# Patient Record
Sex: FEMALE | Race: BLACK OR AFRICAN AMERICAN | NOT HISPANIC OR LATINO | URBAN - METROPOLITAN AREA
[De-identification: names, ages, dates, MRNs, and addresses within clinical notes are randomized per-mention and may not be internally consistent; named-entity substitution may affect disease eponyms.]

---

## 2023-03-29 ENCOUNTER — INPATIENT (INPATIENT)
Facility: HOSPITAL | Age: 48
LOS: 1 days | Discharge: HOME | End: 2023-03-31
Attending: INTERNAL MEDICINE | Admitting: INTERNAL MEDICINE
Payer: SELF-PAY

## 2023-03-29 VITALS
SYSTOLIC BLOOD PRESSURE: 117 MMHG | HEART RATE: 81 BPM | TEMPERATURE: 98 F | RESPIRATION RATE: 18 BRPM | DIASTOLIC BLOOD PRESSURE: 77 MMHG | OXYGEN SATURATION: 100 %

## 2023-03-29 PROCEDURE — 99285 EMERGENCY DEPT VISIT HI MDM: CPT

## 2023-03-29 NOTE — CHART NOTE - NSCHARTNOTEFT_GEN_A_CORE
Pt is a 48 y/o female who lives in [place], no known cardiac hx, presenting with a variety of symptoms per sister with whom she has been living. Symptoms include disorientation, not sleeping at night, weight loss, fatigue, dizziness. Patient began taking levothyroxine 25mcg daily two weeks ago for recently diagnosed hypothyroidism. TSH today 82.57. Per ED team, patient is awake, alert and oriented x3. Sodium 140, BP, HR and temp normal. Weighs ~66kg. Full weight-based levothyroxine dose is about 100mcg daily. Would empirically increase levothyroxine to 50mcg daily. Can give 25mcg PO tonight to supplement this morning's dose and can use 50mcg PO daily starting tomorrow morning on an empty stomach. FT4 pending.    Full consult on 3/30.    Discussed with ED FLORIDALMA Fisher.    Jalen Ott DO, Endocrinology Fellow  For follow-up questions, discharge recommendations, or new consults please call answering service at [phone] (weekdays), [phone] (nights/weekends). For nonurgent matters, please email [email or web address] or [email or web address].

## 2023-03-29 NOTE — ED ADULT TRIAGE NOTE - CHIEF COMPLAINT QUOTE
downtime triage:  c/o worsening palpitations, anxiety, chest pain x 2 weeks. Pt diagnosed with hypothyroidism in [place]. recently prescribed levothyroxine 25 mcg.    PMH- hypothyroidism

## 2023-03-30 DIAGNOSIS — R74.8 ABNORMAL LEVELS OF OTHER SERUM ENZYMES: ICD-10-CM

## 2023-03-30 DIAGNOSIS — R41.82 ALTERED MENTAL STATUS, UNSPECIFIED: ICD-10-CM

## 2023-03-30 DIAGNOSIS — N91.5 OLIGOMENORRHEA, UNSPECIFIED: ICD-10-CM

## 2023-03-30 DIAGNOSIS — I10 ESSENTIAL (PRIMARY) HYPERTENSION: ICD-10-CM

## 2023-03-30 DIAGNOSIS — K59.00 CONSTIPATION, UNSPECIFIED: ICD-10-CM

## 2023-03-30 DIAGNOSIS — G93.41 METABOLIC ENCEPHALOPATHY: ICD-10-CM

## 2023-03-30 DIAGNOSIS — Z29.9 ENCOUNTER FOR PROPHYLACTIC MEASURES, UNSPECIFIED: ICD-10-CM

## 2023-03-30 DIAGNOSIS — E03.9 HYPOTHYROIDISM, UNSPECIFIED: ICD-10-CM

## 2023-03-30 DIAGNOSIS — E78.5 HYPERLIPIDEMIA, UNSPECIFIED: ICD-10-CM

## 2023-03-30 LAB
ALBUMIN SERPL ELPH-MCNC: 5.2 G/DL — HIGH (ref 3.3–5)
ALP SERPL-CCNC: 71 U/L — SIGNIFICANT CHANGE UP (ref 40–120)
ALT FLD-CCNC: 69 U/L — HIGH (ref 4–33)
ANION GAP SERPL CALC-SCNC: 12 MMOL/L — SIGNIFICANT CHANGE UP (ref 7–14)
AST SERPL-CCNC: 43 U/L — HIGH (ref 4–32)
BASOPHILS # BLD AUTO: 0.02 K/UL — SIGNIFICANT CHANGE UP (ref 0–0.2)
BASOPHILS NFR BLD AUTO: 0.4 % — SIGNIFICANT CHANGE UP (ref 0–2)
BILIRUB SERPL-MCNC: 0.6 MG/DL — SIGNIFICANT CHANGE UP (ref 0.2–1.2)
BLOOD GAS VENOUS COMPREHENSIVE RESULT: SIGNIFICANT CHANGE UP
BUN SERPL-MCNC: 12 MG/DL — SIGNIFICANT CHANGE UP (ref 7–23)
CALCIUM SERPL-MCNC: 10.5 MG/DL — SIGNIFICANT CHANGE UP (ref 8.4–10.5)
CHLORIDE SERPL-SCNC: 101 MMOL/L — SIGNIFICANT CHANGE UP (ref 98–107)
CO2 SERPL-SCNC: 24 MMOL/L — SIGNIFICANT CHANGE UP (ref 22–31)
CREAT SERPL-MCNC: 1.09 MG/DL — SIGNIFICANT CHANGE UP (ref 0.5–1.3)
EGFR: 62 ML/MIN/1.73M2 — SIGNIFICANT CHANGE UP
EOSINOPHIL # BLD AUTO: 0.04 K/UL — SIGNIFICANT CHANGE UP (ref 0–0.5)
EOSINOPHIL NFR BLD AUTO: 0.7 % — SIGNIFICANT CHANGE UP (ref 0–6)
GLUCOSE SERPL-MCNC: 100 MG/DL — HIGH (ref 70–99)
HCT VFR BLD CALC: 44 % — SIGNIFICANT CHANGE UP (ref 34.5–45)
HGB BLD-MCNC: 14 G/DL — SIGNIFICANT CHANGE UP (ref 11.5–15.5)
IANC: 2.98 K/UL — SIGNIFICANT CHANGE UP (ref 1.8–7.4)
IMM GRANULOCYTES NFR BLD AUTO: 0.2 % — SIGNIFICANT CHANGE UP (ref 0–0.9)
LYMPHOCYTES # BLD AUTO: 1.88 K/UL — SIGNIFICANT CHANGE UP (ref 1–3.3)
LYMPHOCYTES # BLD AUTO: 35.1 % — SIGNIFICANT CHANGE UP (ref 13–44)
MCHC RBC-ENTMCNC: 29.5 PG — SIGNIFICANT CHANGE UP (ref 27–34)
MCHC RBC-ENTMCNC: 31.8 GM/DL — LOW (ref 32–36)
MCV RBC AUTO: 92.8 FL — SIGNIFICANT CHANGE UP (ref 80–100)
MONOCYTES # BLD AUTO: 0.42 K/UL — SIGNIFICANT CHANGE UP (ref 0–0.9)
MONOCYTES NFR BLD AUTO: 7.9 % — SIGNIFICANT CHANGE UP (ref 2–14)
NEUTROPHILS # BLD AUTO: 2.98 K/UL — SIGNIFICANT CHANGE UP (ref 1.8–7.4)
NEUTROPHILS NFR BLD AUTO: 55.7 % — SIGNIFICANT CHANGE UP (ref 43–77)
NRBC # BLD: 0 /100 WBCS — SIGNIFICANT CHANGE UP (ref 0–0)
NRBC # FLD: 0 K/UL — SIGNIFICANT CHANGE UP (ref 0–0)
PLATELET # BLD AUTO: 233 K/UL — SIGNIFICANT CHANGE UP (ref 150–400)
POTASSIUM SERPL-MCNC: 4.3 MMOL/L — SIGNIFICANT CHANGE UP (ref 3.5–5.3)
POTASSIUM SERPL-SCNC: 4.3 MMOL/L — SIGNIFICANT CHANGE UP (ref 3.5–5.3)
PROT SERPL-MCNC: 8.6 G/DL — HIGH (ref 6–8.3)
RBC # BLD: 4.74 M/UL — SIGNIFICANT CHANGE UP (ref 3.8–5.2)
RBC # FLD: 13.6 % — SIGNIFICANT CHANGE UP (ref 10.3–14.5)
SODIUM SERPL-SCNC: 137 MMOL/L — SIGNIFICANT CHANGE UP (ref 135–145)
T3 SERPL-MCNC: 101 NG/DL — SIGNIFICANT CHANGE UP (ref 80–200)
T4 AB SER-ACNC: 4.83 UG/DL — LOW (ref 5.1–13)
TSH SERPL-MCNC: 99.25 UIU/ML — HIGH (ref 0.27–4.2)
WBC # BLD: 5.35 K/UL — SIGNIFICANT CHANGE UP (ref 3.8–10.5)
WBC # FLD AUTO: 5.35 K/UL — SIGNIFICANT CHANGE UP (ref 3.8–10.5)

## 2023-03-30 PROCEDURE — 70450 CT HEAD/BRAIN W/O DYE: CPT | Mod: 26

## 2023-03-30 PROCEDURE — 99223 1ST HOSP IP/OBS HIGH 75: CPT | Mod: GC

## 2023-03-30 PROCEDURE — 99255 IP/OBS CONSLTJ NEW/EST HI 80: CPT | Mod: GC

## 2023-03-30 RX ORDER — INFLUENZA VIRUS VACCINE 15; 15; 15; 15 UG/.5ML; UG/.5ML; UG/.5ML; UG/.5ML
0.5 SUSPENSION INTRAMUSCULAR ONCE
Refills: 0 | Status: DISCONTINUED | OUTPATIENT
Start: 2023-03-30 | End: 2023-03-31

## 2023-03-30 RX ORDER — ONDANSETRON 8 MG/1
4 TABLET, FILM COATED ORAL EVERY 8 HOURS
Refills: 0 | Status: DISCONTINUED | OUTPATIENT
Start: 2023-03-30 | End: 2023-03-31

## 2023-03-30 RX ORDER — LEVOTHYROXINE SODIUM 125 MCG
103 TABLET ORAL ONCE
Refills: 0 | Status: DISCONTINUED | OUTPATIENT
Start: 2023-03-30 | End: 2023-03-30

## 2023-03-30 RX ORDER — LANOLIN ALCOHOL/MO/W.PET/CERES
3 CREAM (GRAM) TOPICAL AT BEDTIME
Refills: 0 | Status: DISCONTINUED | OUTPATIENT
Start: 2023-03-30 | End: 2023-03-31

## 2023-03-30 RX ORDER — SODIUM CHLORIDE 9 MG/ML
1000 INJECTION INTRAMUSCULAR; INTRAVENOUS; SUBCUTANEOUS ONCE
Refills: 0 | Status: COMPLETED | OUTPATIENT
Start: 2023-03-30 | End: 2023-03-30

## 2023-03-30 RX ORDER — ACETAMINOPHEN 500 MG
650 TABLET ORAL EVERY 6 HOURS
Refills: 0 | Status: DISCONTINUED | OUTPATIENT
Start: 2023-03-30 | End: 2023-03-31

## 2023-03-30 RX ORDER — LEVOTHYROXINE SODIUM 125 MCG
103 TABLET ORAL AT BEDTIME
Refills: 0 | Status: DISCONTINUED | OUTPATIENT
Start: 2023-03-30 | End: 2023-03-31

## 2023-03-30 RX ADMIN — Medication 1 MILLIGRAM(S): at 17:31

## 2023-03-30 RX ADMIN — SODIUM CHLORIDE 1000 MILLILITER(S): 9 INJECTION INTRAMUSCULAR; INTRAVENOUS; SUBCUTANEOUS at 17:30

## 2023-03-30 RX ADMIN — SODIUM CHLORIDE 1000 MILLILITER(S): 9 INJECTION INTRAMUSCULAR; INTRAVENOUS; SUBCUTANEOUS at 19:39

## 2023-03-30 RX ADMIN — Medication 103 MICROGRAM(S): at 22:23

## 2023-03-30 NOTE — H&P ADULT - PROBLEM SELECTOR PLAN 4
- ddx include perimenopausal vs thyroid insufficiency vs both  - will c/w synthroid now and pt requires outpt f/u w/ endo re oligomenorrhea likely caused by thyroid insufficiency

## 2023-03-30 NOTE — CONSULT NOTE ADULT - ASSESSMENT
This is a 48 yo F .w no known PMH who presents due to AMS at home. Family is concerned patient's mental status has been deteriorating recently and patient has appeared confused and disoriented from her baseline. Endocrinology consulted for severe hypothyroidism.    #severe hypothyroidism  -please check TPO antibody  -s/p LT4 103mcg IV x1  -can continue levothyroxine 103mcg IV nightly starting tomorrow  -recheck FT on 4/3/23  -check 8AM cortisol - low suspicion for myxedema at this time, but given family's concerns can assess adrenal function as well    #discharge  -plan to discharge on Levothyroxine 137mcg PO daily - reviewed with patient and family how to properly take this medication  -patient will need repeat labs in 4 weeks  -will likely need to follow up with endocrinology in [place] but can arrange follow up with our endocrine department if she remains in [place] through May    #HLD  -can evaluate for hyperlipidemia as an outpatient given current hypothyroidism    #HTN  -no evidence of HTN  -BP goal <130/80    Case discussed with Dr. Toya Bowles MD  Endocrine Fellow  Can be reached via teams. For follow up questions, discharge recommendations, or new consults, please call answering service at [phone] (weekdays); [phone] (nights/weekends) This is a 46 yo F .w no known PMH who presents due to AMS at home. Family is concerned patient's mental status has been deteriorating recently and patient has appeared confused and disoriented from her baseline. Endocrinology consulted for severe hypothyroidism.    #severe hypothyroidism  -please check TPO antibody  -s/p LT4 103mcg IV x1  -can continue levothyroxine 103mcg IV nightly starting tomorrow  -recheck FT4 on 4/3/23  -check 8AM cortisol - low suspicion for myxedema at this time, but given family's concerns can assess adrenal function as well    #discharge  -plan to discharge on Levothyroxine 137mcg PO daily - reviewed with patient and family how to properly take this medication  -patient will need repeat labs in 4 weeks  -will likely need to follow up with endocrinology in [place] but can arrange follow up with our endocrine department if she remains in [place] through May    #HLD  -can evaluate for hyperlipidemia as an outpatient given current hypothyroidism    #HTN  -no evidence of HTN  -BP goal <130/80    Case discussed with Dr. Toya Bowles MD  Endocrine Fellow  Can be reached via teams. For follow up questions, discharge recommendations, or new consults, please call answering service at [phone] (weekdays); [phone] (nights/weekends)

## 2023-03-30 NOTE — ED CDU PROVIDER INITIAL DAY NOTE - ATTENDING APP SHARED VISIT CONTRIBUTION OF CARE
I agree with PA documentation   this chart was documented during computer downtime  refer to my initial ED note also documented on paper    as documented by PA  "Patient is a 47-year-old female recent diagnosis of thyroid dysfunction in her country of [place] 1 week ago, here on vacation to visit family, brought in by family for episodes of disorientation, weakness, and numbness/tingling to arms and legs, as well as altered mental status.  In the main ED patient was found to have a TSH of 82.57, endocrine was contacted, recommended starting 50 mcg p.o. daily.  Throughout the day the patient was noted to be somewhat altered, stated that she would like to be discharged, states she cannot " trust" the healthcare system so would like to be discharged.  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  States she jogs regularly and eats healthy.  Family at bedside attesting to this.  Patient's niece who is a physician was concerned patient may have myxedema due to her mental status, however vital signs have been stable.  Patient's neuro exam has been nonfocal.  No known history of psychiatric illness. "    placed in CDU for endocrinology to see due to severe hypothyroidism

## 2023-03-30 NOTE — PATIENT PROFILE ADULT - FALL HARM RISK - HARM RISK INTERVENTIONS
Assistance with ambulation/Assistance OOB with selected safe patient handling equipment/Communicate Risk of Fall with Harm to all staff/Reinforce activity limits and safety measures with patient and family/Review medications for side effects contributing to fall risk/Sit up slowly, dangle for a short time, stand at bedside before walking/Tailored Fall Risk Interventions/Visual Cue: Yellow wristband and red socks/Bed in lowest position, wheels locked, appropriate side rails in place/Call bell, personal items and telephone in reach/Instruct patient to call for assistance before getting out of bed or chair/Non-slip footwear when patient is out of bed/[place] to call system/Physically safe environment - no spills, clutter or unnecessary equipment/Purposeful Proactive Rounding/Room/bathroom lighting operational, light cord in reach

## 2023-03-30 NOTE — H&P ADULT - PROBLEM SELECTOR PLAN 3
- DVT: none, pt ambulatory  - diet: regular  - dispo: home - pt constipated as her last BM at least 2d ago  - likely caused by hypothyroidism  - c/w synthroid IV while inpatient and PO outpatient  - will encourage PO intake and start bowel regimen - pt constipated as her last BM at least 2d ago  - likely caused by hypothyroidism  - c/w synthroid IV while inpatient and PO outpatient  - will encourage PO intake and start bowel regimen  -Senna, Miralax for now

## 2023-03-30 NOTE — H&P ADULT - NSHPLABSRESULTS_GEN_ALL_CORE
137  |  101  |  12  ----------------------------<  100<H>  4.3   |  24  |  1.09      140  |  104  |  9   ----------------------------<  88  4.5   |  22  |  1.05    Ca    10.5      30 Mar 2023 17:20  Ca    10.4      29 Mar 2023 16:30    TPro  8.6<H>  /  Alb  5.2<H>  /  TBili  0.6  /  DBili  x   /  AST  43<H>  /  ALT  69<H>  /  AlkPhos  71    TPro  8.5<H>  /  Alb  5.2<H>  /  TBili  0.7  /  DBili  x   /  AST  51<H>  /  ALT  67<H>  /  AlkPhos  72                      Urinalysis Basic - ( 29 Mar 2023 17:47 )    Color: Light Yellow / Appearance: Clear / S.012 / pH: x  Gluc: x / Ketone: Negative  / Bili: Negative / Urobili: <2 mg/dL   Blood: x / Protein: Negative / Nitrite: Negative   Leuk Esterase: Negative / RBC: x / WBC x   Sq Epi: x / Non Sq Epi: x / Bacteria: x                              14.0   5.35  )-----------( 233      ( 30 Mar 2023 17:20 )             44.0                         14.0   5.39  )-----------( 205      ( 29 Mar 2023 16:30 )             43.1     CAPILLARY BLOOD GLUCOSE    Thyroid Stimulating Hormone, Serum: 99.25 uIU/mL (23 @ 17:20)  Free Thyroxine, Serum: 0.5 ng/dL (23 @ 16:30) .    -Personally interpreted EKbpm, qtc 392, no acute Tw or ST, no PACs or PVCs    -Personally reviewed multiple labs below:        137  |  101  |  12  ----------------------------<  100<H>  4.3   |  24  |  1.09      140  |  104  |  9   ----------------------------<  88  4.5   |  22  |  1.05    Ca    10.5      30 Mar 2023 17:20  Ca    10.4      29 Mar 2023 16:30    TPro  8.6<H>  /  Alb  5.2<H>  /  TBili  0.6  /  DBili  x   /  AST  43<H>  /  ALT  69<H>  /  AlkPhos  71    TPro  8.5<H>  /  Alb  5.2<H>  /  TBili  0.7  /  DBili  x   /  AST  51<H>  /  ALT  67<H>  /  AlkPhos  72                      Urinalysis Basic - ( 29 Mar 2023 17:47 )    Color: Light Yellow / Appearance: Clear / S.012 / pH: x  Gluc: x / Ketone: Negative  / Bili: Negative / Urobili: <2 mg/dL   Blood: x / Protein: Negative / Nitrite: Negative   Leuk Esterase: Negative / RBC: x / WBC x   Sq Epi: x / Non Sq Epi: x / Bacteria: x               14.0   5.35  )-----------( 233      ( 30 Mar 2023 17:20 )             44.0                         14.0   5.39  )-----------( 205      ( 29 Mar 2023 16:30 )             43.1     CAPILLARY BLOOD GLUCOSE    Thyroid Stimulating Hormone, Serum: 99.25 uIU/mL (23 @ 17:20)  Free Thyroxine, Serum: 0.5 ng/dL (23 @ 16:30)      -Personally reviewed: CT BRAIN     PROCEDURE DATE:  2023    INTERPRETATION:  CLINICAL INDICATION: Altered mental status  COMPARISON: No prior imaging available for comparison.  FINDINGS:  There is no acute intra-axial or extra-axial hemorrhage. There is no mass   effect.  Gray-white differentiation is intact.  The ventricles are normal in size.  The visualized paranasal sinuses and mastoid air cells are clear.  The orbits are within normal limits.  The calvarium is intact.  IMPRESSION:  No acute intra-cranial hemorrhage or mass effect.

## 2023-03-30 NOTE — H&P ADULT - NSHPPHYSICALEXAM_GEN_ALL_CORE
VITALS:   T(C): 36.7 (03-30-23 @ 18:34), Max: 37.1 (03-29-23 @ 22:39)  HR: 80 (03-30-23 @ 18:34) (72 - 88)  BP: 111/76 (03-30-23 @ 18:34) (110/85 - 140/72)  RR: 16 (03-30-23 @ 18:34) (16 - 18)  SpO2: 97% (03-30-23 @ 18:34) (97% - 100%)    GENERAL: NAD, lying in bed comfortably  HEAD:  Atraumatic, normocephalic  EYES: EOMI, PERRLA, conjunctiva and sclera clear  ENT: Moist mucous membranes, thyroid gland normal, no nodules felt  NECK: Supple, no JVD  HEART: Regular rate and rhythm, no murmurs, rubs, or gallops  LUNGS: Unlabored respirations.  Clear to auscultation bilaterally, no crackles, wheezing, or rhonchi  ABDOMEN: Soft, nontender, nondistended, +BS  EXTREMITIES: 2+ peripheral pulses bilaterally. No clubbing, cyanosis, or edema  NERVOUS SYSTEM:  A&Ox3, no focal deficits   SKIN: No rashes or lesions Vital Signs Last 24 Hrs  T(C): 36.7 (30 Mar 2023 22:01), Max: 37.1 (30 Mar 2023 09:47)  T(F): 98 (30 Mar 2023 22:01), Max: 98.7 (30 Mar 2023 09:47)  HR: 84 (30 Mar 2023 22:01) (75 - 88)  BP: 113/78 (30 Mar 2023 22:01) (111/76 - 140/72)  BP(mean): --  RR: 17 (30 Mar 2023 22:01) (16 - 18)  SpO2: 100% (30 Mar 2023 22:01) (97% - 100%)    Parameters below as of 30 Mar 2023 22:01  Patient On (Oxygen Delivery Method): room air    GENERAL: NAD, lying in bed comfortably  HEAD:  Atraumatic, normocephalic  EYES: EOMI, PERRLA, conjunctiva and sclera clear  ENT: Moist mucous membranes, thyroid gland normal, no nodules felt  NECK: Supple, no JVD  HEART: Regular rate and rhythm, no murmurs, rubs, or gallops  LUNGS: Unlabored respirations.  Clear to auscultation bilaterally, no crackles, wheezing, or rhonchi  ABDOMEN: Soft, nontender, nondistended, +BS  EXTREMITIES: 2+ peripheral pulses bilaterally. No clubbing, cyanosis, or edema  NERVOUS SYSTEM:  A&Ox3, no focal deficits, slightly lethargic   SKIN: No rashes or lesions

## 2023-03-30 NOTE — H&P ADULT - NSHPREVIEWOFSYSTEMS_GEN_ALL_CORE
CONSTITUTIONAL: No weakness, fevers or chills  EYES/ENT: No visual changes;  No vertigo or throat pain   NECK: No pain or stiffness  RESPIRATORY: No cough, wheezing, hemoptysis; No shortness of breath  CARDIOVASCULAR: No chest pain or palpitations  GASTROINTESTINAL: +constipation  GENITOURINARY: No dysuria, frequency or hematuria  NEUROLOGICAL: No numbness or weakness  SKIN: +dry skin  PSYCH: +stress CONSTITUTIONAL: No weakness, fevers or chills  EYES/ENT: No visual changes;  No vertigo or throat pain   NECK: No pain or stiffness  RESPIRATORY: No cough, wheezing, hemoptysis; No shortness of breath  CARDIOVASCULAR: No chest pain (contrary to triage note), + palpitations  GASTROINTESTINAL: +constipation  GENITOURINARY: No dysuria, frequency or hematuria  NEUROLOGICAL: No numbness or weakness  SKIN: +dry skin  PSYCH: +stress CONSTITUTIONAL: + gen weakness, no fevers or chills  EYES/ENT: No visual changes;  No vertigo or throat pain   NECK: No pain or stiffness  RESPIRATORY: No cough, wheezing, hemoptysis; No shortness of breath  CARDIOVASCULAR: No chest pain (contrary to triage note), + palpitations  GASTROINTESTINAL: +constipation  GENITOURINARY: No dysuria, frequency or hematuria  NEUROLOGICAL: No numbness or weakness  SKIN: +dry skin  PSYCH: +stress

## 2023-03-30 NOTE — H&P ADULT - ASSESSMENT
47F PMH recently identified hypothyroidism presented to the ED due to abnormal behaviors. 46yo Female MHx a/w severe symptomatic hypothyroidism c/b metabolic encephelopathy;

## 2023-03-30 NOTE — ED CDU PROVIDER DISPOSITION NOTE - CLINICAL COURSE
Patient is a 47-year-old female recent diagnosis of thyroid dysfunction in her country of [place] 1 week ago, here on vacation to visit family, brought in by family for episodes of disorientation, weakness, and numbness/tingling to arms and legs, as well as altered mental status.  In the main ED patient was found to have a TSH of 82.57, endocrine was contacted, recommended starting 50 mcg p.o. daily.  Throughout the day the patient was noted to be somewhat altered, stated that she would like to be discharged, states she cannot " trust" the healthcare system so would like to be discharged.  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  States she jogs regularly and eats healthy.  Family at bedside attesting to this.  Patient's niece who is a physician was concerned patient may have myxedema due to her mental status, however vital signs have been stable.  Patient's neuro exam has been nonfocal.  No known history of psychiatric illness.  Patient seen by Dr. Marcano this evening who agrees to start IV Synthroid however low suspicion for myxedema at this time.  Plan to get a CT head to rule out structural reasons for mental status.  Patient given Ativan for anxiety.  Plan to admit for further management of severe hypothyroidism no need for telemetry per hospitalist

## 2023-03-30 NOTE — ED PROVIDER NOTE - NS ED ATTENDING STATEMENT MOD
This was a shared visit with the ANALISA. I reviewed and verified the documentation and independently performed the documented:

## 2023-03-30 NOTE — H&P ADULT - PROBLEM SELECTOR PLAN 2
- pt A&OX3 on my exam but per family has moments of confusion/out of the ordinary  - most likely metabolic iso thyroid deficiency  - c/w thyroid supplementations - pt A&OX3 on my exam but per family has moments of confusion/out of the ordinary  - most likely metabolic iso thyroid deficiency vs neurological cause  - pt had one episode of agitation during the day, receive 1 ativan, s/p head CT  - f/u CT head to make sure no bleed, although no focal neurological signs  - c/w thyroid supplementations - pt A&OX3 on my exam but per family has moments of confusion/out of the ordinary  - most likely metabolic iso thyroid deficiency vs neurological cause  - pt had one episode of agitation during the day, receive 1 ativan, s/p head CT  - f/u CT head to make sure no bleed, although no focal neurological signs  - c/w thyroid supplementations  -Urine tox ordered  -Ammonia in AM

## 2023-03-30 NOTE — H&P ADULT - PROBLEM SELECTOR PLAN 6
- DVT: none, pt ambulatory  - diet: regular  - dispo: home - DVT: Lovenox sq  - diet: regular  - dispo: home

## 2023-03-30 NOTE — H&P ADULT - HISTORY OF PRESENT ILLNESS
47F PMH recently identified hypothyroidism presented to the ED due to abnormal behaviors. Per pt her TSH was high and she did not recollect the exact number when she was visiting her GP in [place] and was started on synthroid 25 a few days ago. Pt and family have noticed that despite on synthroid 25 pt started saying things out of the ordinary which pt herself realized later. She also noticed increased stress in work and was told that she should take a break. She has been gaining wt despite keeping active w/ exercise as she noticed increased size of her clothes. She self reported a 11lbs wt loss after being started on synthroid 25. She is constipated w/ the most recent BM at least 2d ago. Her LMP was 1yr ago.    Since the dx of hypothyroidism, in addition to synthroid 25 prescribed by her GP, she started taking [place] and increased seafood intake as well.    In the ED, VSS, EKG NSR, received PO synthroid 50 and 25, ativan 1 due to agitation, and a head CT pending read. TSH 99. 48yo Female MHx recently identified hypothyroidism presented to the ED due to abnormal behaviors. Per pt her TSH was high and she did not recollect the exact number when she was visiting her GP in [place] and was started on synthroid 25 a few days ago. Pt and family have noticed that despite on synthroid 25 pt started saying things out of the ordinary which pt herself realized later. She also noticed increased stress in work and was told that she should take a break. She has been gaining wt despite keeping active w/ exercise as she noticed increased size of her clothes. She self reported a 11lbs wt loss after being started on synthroid 25. She is constipated w/ the most recent BM at least 2d ago. Her LMP was 1yr ago.    Since the dx of hypothyroidism, in addition to synthroid 25 prescribed by her GP, she started taking [place] and increased seafood intake as well.    In the ED, VSS, EKG NSR, received PO synthroid 50 and 25, ativan 1 due to agitation, and a head CT pending read. TSH 99. 46yo Female MHx recently identified hypothyroidism presented to the ED due to abnormal behaviors. Per pt her TSH was "high", did not recollect the exact value when she was visiting her GP in [place]. Was started on Synthroid 25mcg a few days ago. Pt and family have noticed that despite being on synthroid pt started saying things out of the ordinary, which pt herself realized later. She also noticed increased sensation of being tired and stressed  at work and was told that she should take a break. She has been gaining wt despite keeping active w/ exercise as she noticed increased size of her clothes. She self reported a 11lbs wt loss after being started on synthroid. She has been constipated w/ the most recent BM at least 2d ago. Her LMP was 1yr ago.  Since the dx of hypothyroidism, in addition to synthroid 25 prescribed by her GP, she started taking [place] and increased seafood intake as well.    ED course: VSS, EKG NSR,  ativan 1 due to agitation

## 2023-03-30 NOTE — H&P ADULT - RESPIRATORY
Risks/benefits discussed with patient/surrogate/Vaccine Information Sheet (VIS) provided-VIS date: 8/6/21
no wheezes/no rales/no rhonchi

## 2023-03-30 NOTE — H&P ADULT - PROBLEM SELECTOR PLAN 5
- DVT: none, pt ambulatory  - diet: regular  - dispo: home - although pt's samples are mildly hemolyzed, both samples have mildly elevated LFTs  - unsure if it is side effects of sea [place]  - will f/u w/ full liver panel - although pt's samples are mildly hemolyzed, both samples have mildly elevated LFTs  - unsure if it is side effects of sea [place]  - acute hepatitis panel ordered  -trend LFTs

## 2023-03-30 NOTE — H&P ADULT - NSHPSOCIALHISTORY_GEN_ALL_CORE
no toxic habits  lives by herself  has a partner, but sexually inactive (last encounter months ago)  LMP 1yr ago

## 2023-03-30 NOTE — CONSULT NOTE ADULT - SUBJECTIVE AND OBJECTIVE BOX
Consulted for hypothyroidism    HPI:  This is a 48 yo F .w no known PMH who presents due to AMS at home. Family is concerned patient's mental status has been deteriorating recently and patient has appeared confused and disoriented from her baseline. Endocrinology consulted for hypothyroidism.    Patient found to have TSH 82 and FT4 0.5 on admission.    Per patient and family, he patient was found to have hypothyroidism back home in [place] 3 months ago. Family reports TSH was only slightly off. No medication was started at that time. More recently labs were done in [place] and patient had worsening hypothyroidism and was started on 25mcg levothyroxine a few days ago.     Patient has no history of cardiac disease or arrhythmia.     Patient denies symptoms of cold intolerance but has had weight gain, constipation, and excessively dry skin.  Her LMP was 1 year ago. She gets hot flashes sometimes    No history of lithium, amiodarone use. She has never had radiation therapy to the head or neck.       PAST MEDICAL & SURGICAL HISTORY: myomectomy      FAMILY HISTORY: patient has 2 sisters with Graves' disease      Social History: denies all toxic habits    Home Medications:  levothyroxine 25mcg daily    MEDICATIONS  (STANDING):  levothyroxine Injectable 103 MICROGram(s) IV Push once    MEDICATIONS  (PRN):      Allergies    No Known Allergies    Intolerances      Review of Systems:  Constitutional: No fever  Eyes: No blurry vision  Neuro: No tremors  HEENT: No pain  Cardiovascular: No chest pain, palpitations  Respiratory: No SOB, no cough  GI: No nausea, vomiting, abdominal pain  : No dysuria  Skin: no rash  Psych: no depression  Endocrine: no polyuria, polydipsia  Hem/lymph: no swelling  Osteoporosis: no fractures    PHYSICAL EXAM:  VITALS: T(C): 36.7 (03-30-23 @ 18:34)  T(F): 98 (03-30-23 @ 18:34), Max: 98.7 (03-29-23 @ 22:39)  HR: 80 (03-30-23 @ 18:34) (72 - 88)  BP: 111/76 (03-30-23 @ 18:34) (110/85 - 140/72)  RR:  (16 - 18)  SpO2:  (97% - 100%)  Wt(kg): --  GENERAL: NAD  EYES: No proptosis, no lid lag, anicteric  THYROID: Normal size, no palpable nodules  RESPIRATORY: Clear to auscultation bilaterally  CARDIOVASCULAR: Regular rate and rhythm  GI: Soft, nontender, non distended  EXT: b/l feet without wounds; 2+ pulses  PSYCH: Alert and oriented x 3, reactive mood                              14.0   5.35  )-----------( 233      ( 30 Mar 2023 17:20 )             44.0       03-30    137  |  101  |  12  ----------------------------<  100<H>  4.3   |  24  |  1.09    eGFR: 62    Ca    10.5      03-30    TPro  8.6<H>  /  Alb  5.2<H>  /  TBili  0.6  /  DBili  x   /  AST  43<H>  /  ALT  69<H>  /  AlkPhos  71  03-30      Thyroid Function Tests:  03-30 @ 17:20 TSH 99.25 FreeT4 -- T3 -- Anti TPO -- Anti Thyroglobulin Ab -- TSI --  03-29 @ 16:30 TSH 82.57 FreeT4 0.5 T3 -- Anti TPO -- Anti Thyroglobulin Ab -- TSI --      A1C with Estimated Average Glucose Result: 5.6 % (03-29-23 @ 16:30)          Radiology:

## 2023-03-30 NOTE — ED PROVIDER NOTE - ATTENDING APP SHARED VISIT CONTRIBUTION OF CARE
I attest to PA documentation    this patient was seen during downtime and my documentation is on paper chart

## 2023-03-30 NOTE — H&P ADULT - PROBLEM SELECTOR PLAN 1
- TSH 99 despite on synthroid 25  - s/p synthroid 25 and 50 in the ED  - will c/w IV synthroid while inpatient  - will finish hypothyroidism workup including TPO to r/o Hashimoto considering FHx of Graves  - check A1C and lipid panels  - f/u endo recs  - consider thyroid US - TSH 99 despite on synthroid 25  - s/p synthroid 25 and 50 in the ED  - will c/w IV synthroid while inpatient  - will finish hypothyroidism workup including TPO to r/o Hashimoto considering FHx of Graves  - check A1C and lipid panels  - not in myxedema coma, but will still check AM cortisol to r/o adrenal insufficiency  - f/u endo recs  - consider thyroid US Constipation, confusion, lethargy; Slowed on exam; Severe likely under-supplemented hypothyroidism;   Evaluated by Endocrinology;    TSH 99 despite on synthroid 25  - Received 103mcg IVP synthroid   - will c/w IV synthroid while inpatient  - will finish hypothyroidism workup including TPO to r/o Hashimoto considering FHx of Graves  - check A1C and lipid panels  - not in myxedema coma  - will  check AM cortisol to r/o adrenal insufficiency  - f/u Endocrinology  recs  - consider thyroid US

## 2023-03-31 ENCOUNTER — TRANSCRIPTION ENCOUNTER (OUTPATIENT)
Age: 48
End: 2023-03-31

## 2023-03-31 VITALS
TEMPERATURE: 97 F | SYSTOLIC BLOOD PRESSURE: 122 MMHG | RESPIRATION RATE: 16 BRPM | HEART RATE: 84 BPM | OXYGEN SATURATION: 99 % | DIASTOLIC BLOOD PRESSURE: 86 MMHG

## 2023-03-31 LAB
A1C WITH ESTIMATED AVERAGE GLUCOSE RESULT: 5.4 % — SIGNIFICANT CHANGE UP (ref 4–5.6)
ALBUMIN SERPL ELPH-MCNC: 4.6 G/DL — SIGNIFICANT CHANGE UP (ref 3.3–5)
ALP SERPL-CCNC: 57 U/L — SIGNIFICANT CHANGE UP (ref 40–120)
ALT FLD-CCNC: 56 U/L — HIGH (ref 4–33)
ANION GAP SERPL CALC-SCNC: 10 MMOL/L — SIGNIFICANT CHANGE UP (ref 7–14)
AST SERPL-CCNC: 32 U/L — SIGNIFICANT CHANGE UP (ref 4–32)
BASOPHILS # BLD AUTO: 0.01 K/UL — SIGNIFICANT CHANGE UP (ref 0–0.2)
BASOPHILS NFR BLD AUTO: 0.2 % — SIGNIFICANT CHANGE UP (ref 0–2)
BILIRUB SERPL-MCNC: 1 MG/DL — SIGNIFICANT CHANGE UP (ref 0.2–1.2)
BUN SERPL-MCNC: 7 MG/DL — SIGNIFICANT CHANGE UP (ref 7–23)
CALCIUM SERPL-MCNC: 9.9 MG/DL — SIGNIFICANT CHANGE UP (ref 8.4–10.5)
CHLORIDE SERPL-SCNC: 103 MMOL/L — SIGNIFICANT CHANGE UP (ref 98–107)
CHOLEST SERPL-MCNC: 203 MG/DL — HIGH
CO2 SERPL-SCNC: 24 MMOL/L — SIGNIFICANT CHANGE UP (ref 22–31)
CORTIS AM PEAK SERPL-MCNC: 16.2 UG/DL — SIGNIFICANT CHANGE UP (ref 6–18.4)
CREAT SERPL-MCNC: 1.06 MG/DL — SIGNIFICANT CHANGE UP (ref 0.5–1.3)
EGFR: 64 ML/MIN/1.73M2 — SIGNIFICANT CHANGE UP
EOSINOPHIL # BLD AUTO: 0.05 K/UL — SIGNIFICANT CHANGE UP (ref 0–0.5)
EOSINOPHIL NFR BLD AUTO: 1.2 % — SIGNIFICANT CHANGE UP (ref 0–6)
ESTIMATED AVERAGE GLUCOSE: 108 — SIGNIFICANT CHANGE UP
GLUCOSE SERPL-MCNC: 94 MG/DL — SIGNIFICANT CHANGE UP (ref 70–99)
HAV IGM SER-ACNC: SIGNIFICANT CHANGE UP
HBV CORE IGM SER-ACNC: SIGNIFICANT CHANGE UP
HBV SURFACE AG SER-ACNC: SIGNIFICANT CHANGE UP
HCT VFR BLD CALC: 41.6 % — SIGNIFICANT CHANGE UP (ref 34.5–45)
HCV AB S/CO SERPL IA: 0.1 S/CO — SIGNIFICANT CHANGE UP (ref 0–0.99)
HCV AB SERPL-IMP: SIGNIFICANT CHANGE UP
HDLC SERPL-MCNC: 65 MG/DL — SIGNIFICANT CHANGE UP
HGB BLD-MCNC: 13.2 G/DL — SIGNIFICANT CHANGE UP (ref 11.5–15.5)
IANC: 2.03 K/UL — SIGNIFICANT CHANGE UP (ref 1.8–7.4)
IMM GRANULOCYTES NFR BLD AUTO: 0.2 % — SIGNIFICANT CHANGE UP (ref 0–0.9)
LIPID PNL WITH DIRECT LDL SERPL: 121 MG/DL — HIGH
LYMPHOCYTES # BLD AUTO: 1.74 K/UL — SIGNIFICANT CHANGE UP (ref 1–3.3)
LYMPHOCYTES # BLD AUTO: 41.2 % — SIGNIFICANT CHANGE UP (ref 13–44)
MAGNESIUM SERPL-MCNC: 2.4 MG/DL — SIGNIFICANT CHANGE UP (ref 1.6–2.6)
MCHC RBC-ENTMCNC: 29.7 PG — SIGNIFICANT CHANGE UP (ref 27–34)
MCHC RBC-ENTMCNC: 31.7 GM/DL — LOW (ref 32–36)
MCV RBC AUTO: 93.7 FL — SIGNIFICANT CHANGE UP (ref 80–100)
MONOCYTES # BLD AUTO: 0.38 K/UL — SIGNIFICANT CHANGE UP (ref 0–0.9)
MONOCYTES NFR BLD AUTO: 9 % — SIGNIFICANT CHANGE UP (ref 2–14)
NEUTROPHILS # BLD AUTO: 2.03 K/UL — SIGNIFICANT CHANGE UP (ref 1.8–7.4)
NEUTROPHILS NFR BLD AUTO: 48.2 % — SIGNIFICANT CHANGE UP (ref 43–77)
NON HDL CHOLESTEROL: 138 MG/DL — HIGH
NRBC # BLD: 0 /100 WBCS — SIGNIFICANT CHANGE UP (ref 0–0)
NRBC # FLD: 0 K/UL — SIGNIFICANT CHANGE UP (ref 0–0)
PHOSPHATE SERPL-MCNC: 3.2 MG/DL — SIGNIFICANT CHANGE UP (ref 2.5–4.5)
PLATELET # BLD AUTO: 200 K/UL — SIGNIFICANT CHANGE UP (ref 150–400)
POTASSIUM SERPL-MCNC: 3.9 MMOL/L — SIGNIFICANT CHANGE UP (ref 3.5–5.3)
POTASSIUM SERPL-SCNC: 3.9 MMOL/L — SIGNIFICANT CHANGE UP (ref 3.5–5.3)
PROT SERPL-MCNC: 7.6 G/DL — SIGNIFICANT CHANGE UP (ref 6–8.3)
RBC # BLD: 4.44 M/UL — SIGNIFICANT CHANGE UP (ref 3.8–5.2)
RBC # FLD: 13.7 % — SIGNIFICANT CHANGE UP (ref 10.3–14.5)
SODIUM SERPL-SCNC: 137 MMOL/L — SIGNIFICANT CHANGE UP (ref 135–145)
THYROPEROXIDASE AB SERPL-ACNC: 9957 IU/ML — HIGH
TRIGL SERPL-MCNC: 87 MG/DL — SIGNIFICANT CHANGE UP
TSH SERPL-MCNC: 75.71 UIU/ML — HIGH (ref 0.27–4.2)
WBC # BLD: 4.22 K/UL — SIGNIFICANT CHANGE UP (ref 3.8–10.5)
WBC # FLD AUTO: 4.22 K/UL — SIGNIFICANT CHANGE UP (ref 3.8–10.5)

## 2023-03-31 PROCEDURE — 99239 HOSP IP/OBS DSCHRG MGMT >30: CPT | Mod: GC

## 2023-03-31 PROCEDURE — 99232 SBSQ HOSP IP/OBS MODERATE 35: CPT | Mod: GC

## 2023-03-31 RX ORDER — LEVOTHYROXINE SODIUM 125 MCG
1 TABLET ORAL
Qty: 30 | Refills: 0
Start: 2023-03-31 | End: 2023-04-29

## 2023-03-31 RX ORDER — ENOXAPARIN SODIUM 100 MG/ML
40 INJECTION SUBCUTANEOUS EVERY 24 HOURS
Refills: 0 | Status: DISCONTINUED | OUTPATIENT
Start: 2023-03-31 | End: 2023-03-31

## 2023-03-31 RX ORDER — SENNA PLUS 8.6 MG/1
2 TABLET ORAL AT BEDTIME
Refills: 0 | Status: DISCONTINUED | OUTPATIENT
Start: 2023-03-31 | End: 2023-03-31

## 2023-03-31 RX ORDER — POLYETHYLENE GLYCOL 3350 17 G/17G
17 POWDER, FOR SOLUTION ORAL DAILY
Refills: 0 | Status: DISCONTINUED | OUTPATIENT
Start: 2023-03-31 | End: 2023-03-31

## 2023-03-31 RX ORDER — LEVOTHYROXINE SODIUM 125 MCG
1 TABLET ORAL
Refills: 0 | DISCHARGE

## 2023-03-31 RX ORDER — LEVOTHYROXINE SODIUM 125 MCG
103 TABLET ORAL
Refills: 0 | Status: COMPLETED | OUTPATIENT
Start: 2023-03-31 | End: 2023-03-31

## 2023-03-31 RX ADMIN — POLYETHYLENE GLYCOL 3350 17 GRAM(S): 17 POWDER, FOR SOLUTION ORAL at 12:20

## 2023-03-31 RX ADMIN — Medication 103 MICROGRAM(S): at 15:07

## 2023-03-31 NOTE — PROGRESS NOTE ADULT - ASSESSMENT
46yo Female MHx a/w severe symptomatic hypothyroidism c/b metabolic encephelopathy;  48yo Female MHx a/w severe symptomatic hypothyroidism c/b metabolic encephelopathy;

## 2023-03-31 NOTE — PROGRESS NOTE ADULT - ASSESSMENT
This is a 46 yo F .w no known PMH who presents due to AMS at home. Family is concerned patient's mental status has been deteriorating recently and patient has appeared confused and disoriented from her baseline. Endocrinology consulted for severe hypothyroidism.    #severe hypothyroidism  -please check TPO antibody  -s/p LT4 103mcg IV x1  -please give one more dose of IV levothyroxine 103mcg - can be given at 2PM and afterwards patient is safe for discharge  -check 8AM cortisol  16.2    #discharge  -plan to discharge on Levothyroxine 137mcg PO daily - reviewed with patient and family how to properly take this medication  -patient will need repeat labs in 4 weeks  -will likely need to follow up with endocrinology in [place] but can arrange follow up with our endocrine department if she remains in [place] through May    #HLD  -can evaluate for hyperlipidemia as an outpatient given current hypothyroidism    #HTN  -no evidence of HTN  -BP goal <130/80    Case discussed with Dr. Toya Bowles MD  Endocrine Fellow  Can be reached via teams. For follow up questions, discharge recommendations, or new consults, please call answering service at [phone] (weekdays); [phone] (nights/weekends)

## 2023-03-31 NOTE — DISCHARGE NOTE PROVIDER - PROVIDER TOKENS
FREE:[LAST:[Denise],FIRST:[Jourdan],PHONE:[phone]],FAX:[(   [phone]    -],ADDRESS:[[address] [place] [address]],FOLLOWUP:[1 week]]

## 2023-03-31 NOTE — DISCHARGE NOTE PROVIDER - CARE PROVIDER_API CALL
Jourdan Denise  [address] [place] [address]  Phone: [phone]  Fax: (   )    -  Follow Up Time: 1 week

## 2023-03-31 NOTE — DISCHARGE NOTE NURSING/CASE MANAGEMENT/SOCIAL WORK - PATIENT PORTAL LINK FT
You can access the FollowMyHealth Patient Portal offered by [facility] by registering at the following website: [email or web address]. By joining [email or web address]’s FollowMyHealth portal, you will also be able to view your health information using other applications (apps) compatible with our system.

## 2023-03-31 NOTE — PROGRESS NOTE ADULT - PROBLEM SELECTOR PLAN 3
- pt constipated as her last BM at least 2d ago  - likely caused by hypothyroidism  - c/w synthroid IV while inpatient and PO outpatient  - will encourage PO intake and start bowel regimen  -Senna, Miralax for now

## 2023-03-31 NOTE — DISCHARGE NOTE PROVIDER - HOSPITAL COURSE
HPI: 48yo Female MHx recently identified hypothyroidism presented to the ED due to abnormal behaviors. Per pt her TSH was "high", did not recollect the exact value when she was visiting her GP in [place]. Was started on Synthroid 25mcg a few days ago. Pt and family have noticed that despite being on synthroid pt started saying things out of the ordinary, which pt herself realized later. She also noticed increased sensation of being tired and stressed  at work and was told that she should take a break. She has been gaining wt despite keeping active w/ exercise as she noticed increased size of her clothes. She self reported a 11lbs wt loss after being started on synthroid. She has been constipated w/ the most recent BM at least 2d ago. Her LMP was 1yr ago.  Since the dx of hypothyroidism, in addition to synthroid 25 prescribed by her GP, she started taking [place] and increased seafood intake as well.    Hospital course: TSH noted to be as high as 99.25 and free thyroxine low at 0.5. Started on IV synthroid as per endocrinology recs. Low concern for myxedema coma and AM cortisol WNL. After treatment with increased dose of synthroid patient's symptoms improving. After discussion with endocrinology, deemed stable for discharge as patient's symptoms largely improved and mentating at baseline. Bowel regimen will be prescribed at discharge. As per endocrinology recommendations, her free T4 level needs to be rechecked on HPI: 48yo Female MHx recently identified hypothyroidism presented to the ED due to abnormal behaviors. Per pt her TSH was "high", did not recollect the exact value when she was visiting her GP in [place]. Was started on Synthroid 25mcg a few days ago. Pt and family have noticed that despite being on synthroid pt started saying things out of the ordinary, which pt herself realized later. She also noticed increased sensation of being tired and stressed  at work and was told that she should take a break. She has been gaining wt despite keeping active w/ exercise as she noticed increased size of her clothes. She self reported a 11lbs wt loss after being started on synthroid. She has been constipated w/ the most recent BM at least 2d ago. Her LMP was 1yr ago.  Since the dx of hypothyroidism, in addition to synthroid 25 prescribed by her GP, she started taking [place] and increased seafood intake as well.    Hospital course: TSH noted to be as high as 99.25 and free thyroxine low at 0.5. Started on IV synthroid as per endocrinology recs. Low concern for myxedema coma and AM cortisol WNL. After treatment with increased dose of synthroid patient's symptoms improving. Further hypothyroidism workup pending (such as TPO Ab); can be followed outpatient. After discussion with endocrinology, deemed stable for discharge as patient's symptoms largely improved and mentating at baseline. Bowel regimen will be prescribed at discharge. As per endocrinology recommendations, all thyroid labs to be rechecked in 4 weeks; can be done here or in [place] if patient returns by then.     Medication changes:  Increased levothyroxine to 137 mcg daily

## 2023-03-31 NOTE — DISCHARGE NOTE NURSING/CASE MANAGEMENT/SOCIAL WORK - NSDCPEFALRISK_GEN_ALL_CORE
For information on Fall & Injury Prevention, visit: [email or web address] OR  [email or web address] OR  https://www.cdc.gov/steadi/patient.html

## 2023-03-31 NOTE — PROGRESS NOTE ADULT - SUBJECTIVE AND OBJECTIVE BOX
Sultan Cabrera MD  PGY 1 Department of Internal Medicine        Patient is a 47y old  Female who presents with a chief complaint of Confused (30 Mar 2023 20:52)      SUBJECTIVE / OVERNIGHT EVENTS: Pt seen and examined. No acute overnight events. Denies fevers, chills, CP, SOB, Abdominal pain, N/V, Constipation, Diarrhea        MEDICATIONS  (STANDING):  enoxaparin Injectable 40 milliGRAM(s) SubCutaneous every 24 hours  influenza   Vaccine 0.5 milliLiter(s) IntraMuscular once  levothyroxine Injectable 103 MICROGram(s) IV Push at bedtime  polyethylene glycol 3350 17 Gram(s) Oral daily  senna 2 Tablet(s) Oral at bedtime    MEDICATIONS  (PRN):  acetaminophen     Tablet .. 650 milliGRAM(s) Oral every 6 hours PRN Temp greater or equal to 38C (100.4F), Mild Pain (1 - 3)  aluminum hydroxide/magnesium hydroxide/simethicone Suspension 30 milliLiter(s) Oral every 4 hours PRN Dyspepsia  melatonin 3 milliGRAM(s) Oral at bedtime PRN Insomnia  ondansetron Injectable 4 milliGRAM(s) IV Push every 8 hours PRN Nausea and/or Vomiting      I&O's Summary      Vital Signs Last 24 Hrs  T(C): 36.7 (31 Mar 2023 05:30), Max: 37.1 (30 Mar 2023 09:47)  T(F): 98.1 (31 Mar 2023 05:30), Max: 98.7 (30 Mar 2023 09:47)  HR: 81 (31 Mar 2023 05:30) (75 - 88)  BP: 126/74 (31 Mar 2023 05:30) (111/76 - 140/72)  BP(mean): --  RR: 17 (31 Mar 2023 05:30) (16 - 17)  SpO2: 100% (31 Mar 2023 05:30) (97% - 100%)    Parameters below as of 31 Mar 2023 05:30  Patient On (Oxygen Delivery Method): room air        CAPILLARY BLOOD GLUCOSE          PHYSICAL EXAM:  GENERAL: NAD, lying in bed comfortably  HEAD:  Atraumatic, normocephalic  EYES: EOMI, PERRLA, conjunctiva and sclera clear  ENT: Moist mucous membranes, thyroid gland normal, no nodules felt  NECK: Supple, no JVD  HEART: Regular rate and rhythm, no murmurs, rubs, or gallops  LUNGS: Unlabored respirations.  Clear to auscultation bilaterally, no crackles, wheezing, or rhonchi  ABDOMEN: Soft, nontender, nondistended, +BS  EXTREMITIES: 2+ peripheral pulses bilaterally. No clubbing, cyanosis, or edema  NERVOUS SYSTEM:  A&Ox3, no focal deficits, slightly lethargic   SKIN: No rashes or lesions     LABS:                        14.0   5.35  )-----------( 233      ( 30 Mar 2023 17:20 )             44.0     Auto Eosinophil # 0.04  / Auto Eosinophil % 0.7   / Auto Neutrophil # 2.98  / Auto Neutrophil % 55.7  / BANDS % x                            14.0   5.39  )-----------( 205      ( 29 Mar 2023 16:30 )             43.1     Auto Eosinophil # 0.07  / Auto Eosinophil % 1.3   / Auto Neutrophil # 2.78  / Auto Neutrophil % 51.6  / BANDS % x        03-30    137  |  101  |  12  ----------------------------<  100<H>  4.3   |  24  |  1.09  03-29    140  |  104  |  9   ----------------------------<  88  4.5   |  22  |  1.05    Ca    10.5      30 Mar 2023 17:20  TPro  8.6<H>  /  Alb  5.2<H>  /  TBili  0.6  /  DBili  x   /  AST  43<H>  /  ALT  69<H>  /  AlkPhos  71  03-30  TPro  8.5<H>  /  Alb  5.2<H>  /  TBili  0.7  /  DBili  x   /  AST  51<H>  /  ALT  67<H>  /  AlkPhos  72  03-29          Urinalysis Basic - ( 29 Mar 2023 17:47 )    Color: Light Yellow / Appearance: Clear / S.012 / pH: x  Gluc: x / Ketone: Negative  / Bili: Negative / Urobili: <2 mg/dL   Blood: x / Protein: Negative / Nitrite: Negative   Leuk Esterase: Negative / RBC: x / WBC x   Sq Epi: x / Non Sq Epi: x / Bacteria: x            RADIOLOGY & ADDITIONAL TESTS:    Imaging Personally Reviewed:    Consultant(s) Notes Reviewed:      Care Discussed with Consultants/Other Providers:   Sultan Cabrera MD  PGY 1 Department of Internal Medicine        Patient is a 47y old  Female who presents with a chief complaint of Confused (30 Mar 2023 20:52)      SUBJECTIVE / OVERNIGHT EVENTS: Pt seen and examined. No acute overnight events. Patient feels better after IV synthroid. Denies fevers, chills, CP, SOB, Abdominal pain, N/V, Diarrhea. No BM still.        MEDICATIONS  (STANDING):  enoxaparin Injectable 40 milliGRAM(s) SubCutaneous every 24 hours  influenza   Vaccine 0.5 milliLiter(s) IntraMuscular once  levothyroxine Injectable 103 MICROGram(s) IV Push at bedtime  polyethylene glycol 3350 17 Gram(s) Oral daily  senna 2 Tablet(s) Oral at bedtime    MEDICATIONS  (PRN):  acetaminophen     Tablet .. 650 milliGRAM(s) Oral every 6 hours PRN Temp greater or equal to 38C (100.4F), Mild Pain (1 - 3)  aluminum hydroxide/magnesium hydroxide/simethicone Suspension 30 milliLiter(s) Oral every 4 hours PRN Dyspepsia  melatonin 3 milliGRAM(s) Oral at bedtime PRN Insomnia  ondansetron Injectable 4 milliGRAM(s) IV Push every 8 hours PRN Nausea and/or Vomiting      I&O's Summary      Vital Signs Last 24 Hrs  T(C): 36.7 (31 Mar 2023 05:30), Max: 37.1 (30 Mar 2023 09:47)  T(F): 98.1 (31 Mar 2023 05:30), Max: 98.7 (30 Mar 2023 09:47)  HR: 81 (31 Mar 2023 05:30) (75 - 88)  BP: 126/74 (31 Mar 2023 05:30) (111/76 - 140/72)  BP(mean): --  RR: 17 (31 Mar 2023 05:30) (16 - 17)  SpO2: 100% (31 Mar 2023 05:30) (97% - 100%)    Parameters below as of 31 Mar 2023 05:30  Patient On (Oxygen Delivery Method): room air        CAPILLARY BLOOD GLUCOSE          PHYSICAL EXAM:  GENERAL: NAD, lying in bed comfortably  HEAD:  Atraumatic, normocephalic  EYES: EOMI, PERRLA, conjunctiva and sclera clear  ENT: Moist mucous membranes, thyroid gland normal, no nodules felt  NECK: Supple, no JVD  HEART: Regular rate and rhythm, no murmurs, rubs, or gallops  LUNGS: Unlabored respirations.  Clear to auscultation bilaterally, no crackles, wheezing, or rhonchi  ABDOMEN: Soft, nontender, nondistended, +BS  EXTREMITIES: 2+ peripheral pulses bilaterally. No clubbing, cyanosis, or edema  NERVOUS SYSTEM:  A&Ox3, no focal deficits  SKIN: No rashes or lesions     LABS:                        14.0   5.35  )-----------( 233      ( 30 Mar 2023 17:20 )             44.0     Auto Eosinophil # 0.04  / Auto Eosinophil % 0.7   / Auto Neutrophil # 2.98  / Auto Neutrophil % 55.7  / BANDS % x                            14.0   5.39  )-----------( 205      ( 29 Mar 2023 16:30 )             43.1     Auto Eosinophil # 0.07  / Auto Eosinophil % 1.3   / Auto Neutrophil # 2.78  / Auto Neutrophil % 51.6  / BANDS % x        03-30    137  |  101  |  12  ----------------------------<  100<H>  4.3   |  24  |  1.09  03-29    140  |  104  |  9   ----------------------------<  88  4.5   |  22  |  1.05    Ca    10.5      30 Mar 2023 17:20  TPro  8.6<H>  /  Alb  5.2<H>  /  TBili  0.6  /  DBili  x   /  AST  43<H>  /  ALT  69<H>  /  AlkPhos  71  03-30  TPro  8.5<H>  /  Alb  5.2<H>  /  TBili  0.7  /  DBili  x   /  AST  51<H>  /  ALT  67<H>  /  AlkPhos  72  03-29          Urinalysis Basic - ( 29 Mar 2023 17:47 )    Color: Light Yellow / Appearance: Clear / S.012 / pH: x  Gluc: x / Ketone: Negative  / Bili: Negative / Urobili: <2 mg/dL   Blood: x / Protein: Negative / Nitrite: Negative   Leuk Esterase: Negative / RBC: x / WBC x   Sq Epi: x / Non Sq Epi: x / Bacteria: x            RADIOLOGY & ADDITIONAL TESTS:    Imaging Personally Reviewed:    Consultant(s) Notes Reviewed:      Care Discussed with Consultants/Other Providers:

## 2023-03-31 NOTE — DISCHARGE NOTE PROVIDER - NSDCCPTREATMENT_GEN_ALL_CORE_FT
PRINCIPAL PROCEDURE  Procedure: CT  Findings and Treatment:   < end of copied text >  FINDINGS:  There is no acute intra-axial or extra-axial hemorrhage. There is no mass   effect.  Gray-white differentiation is intact.  The ventricles are normal in size.  The visualized paranasal sinuses and mastoid air cells are clear.  The orbits are within normal limits.  The calvarium is intact.  IMPRESSION:  No acute intra-cranial hemorrhage or mass effect.  < from: CT Head No Cont (03.30.23 @ 20:21) >

## 2023-03-31 NOTE — DISCHARGE NOTE PROVIDER - NSDCCPCAREPLAN_GEN_ALL_CORE_FT
PRINCIPAL DISCHARGE DIAGNOSIS  Diagnosis: Hypothyroid  Assessment and Plan of Treatment: You were brought into the hospital because you were having tiredness, constipation, and were noted by family members to be more confused than usual. Imaging of your brain did not show any abnormalities. Our testing found that your thyroid is not functioning well and that the thyroid medication is not at a high enough dose. We initially treated you with an intravenous version of this medication and your symptoms improved. After speaking with endocrinology, we decided we can discharge you on an increased dose of levothyroxine (137 mcg daily). You will need to follow-up with your primary care provider here or in [facility] in four weeks to check your thyroid levels. If you are still here in May, you should follow up with our endocrinologists.

## 2023-03-31 NOTE — PROGRESS NOTE ADULT - PROBLEM SELECTOR PLAN 1
Constipation, confusion, lethargy; Slowed on exam; Severe likely under-supplemented hypothyroidism;   Evaluated by Endocrinology;    TSH 99 despite on synthroid 25  - Received 103mcg IVP synthroid   - will c/w IV synthroid while inpatient  - will finish hypothyroidism workup including TPO to r/o Hashimoto considering FHx of Graves  - check A1C and lipid panels  - not in myxedema coma  - will  check AM cortisol to r/o adrenal insufficiency  - f/u Endocrinology  recs  - consider thyroid US Constipation, confusion, lethargy; Slowed on exam; Severe likely under-supplemented hypothyroidism;   Evaluated by Endocrinology  Mental status and energy now much improved   TSH 99 despite on synthroid 25  - Received 103mcg IVP synthroid; will receive an additional dose before dc  - dc on 137 PO and f/u labs in 4 weeks  - will finish hypothyroidism workup including TPO to r/o Hashimoto considering FHx of Graves  - check A1C normal and lipid panel with some elevated LDL  - not in myxedema coma; normal AM cortisol

## 2023-03-31 NOTE — PROGRESS NOTE ADULT - PROBLEM SELECTOR PLAN 6
- DVT: Lovenox sq  - diet: regular  - dispo: home - DVT: Lovenox sq  - diet: regular  - dispo: home  communication: spoke with pt and sister by bedside 3/31

## 2023-03-31 NOTE — PROGRESS NOTE ADULT - SUBJECTIVE AND OBJECTIVE BOX
Admitted for: Altered mental status        Following for: severe hypothyroidism    Subjective:   Patient feels well. Sister at bedside reports patient 80% back to baseline    AM cortisol 16    No other complaints    MEDICATIONS  (STANDING):  enoxaparin Injectable 40 milliGRAM(s) SubCutaneous every 24 hours  influenza   Vaccine 0.5 milliLiter(s) IntraMuscular once  levothyroxine Injectable 103 MICROGram(s) IV Push at bedtime  polyethylene glycol 3350 17 Gram(s) Oral daily  senna 2 Tablet(s) Oral at bedtime    MEDICATIONS  (PRN):  acetaminophen     Tablet .. 650 milliGRAM(s) Oral every 6 hours PRN Temp greater or equal to 38C (100.4F), Mild Pain (1 - 3)  aluminum hydroxide/magnesium hydroxide/simethicone Suspension 30 milliLiter(s) Oral every 4 hours PRN Dyspepsia  melatonin 3 milliGRAM(s) Oral at bedtime PRN Insomnia  ondansetron Injectable 4 milliGRAM(s) IV Push every 8 hours PRN Nausea and/or Vomiting      Allergies    No Known Allergies    Intolerances          PHYSICAL EXAM:  VITALS: T(C): 36.7 (03-31-23 @ 05:30)  T(F): 98.1 (03-31-23 @ 05:30), Max: 98.4 (03-30-23 @ 14:53)  HR: 81 (03-31-23 @ 05:30) (75 - 88)  BP: 126/74 (03-31-23 @ 05:30) (111/76 - 140/72)  RR:  (16 - 17)  SpO2:  (97% - 100%)  Wt(kg): --  GENERAL: NAD  EYES: No proptosis, no lid lag, anicteric  RESPIRATORY: Clear to auscultation bilaterally  CARDIOVASCULAR: Regular rate and rhythm  GI: Soft, nontender, non distended  EXT: b/l feet without wounds, 2+ pulses  PSYCH: Alert and oriented x 3, reactive affect      A1C with Estimated Average Glucose Result: 5.4 % (03-31-23 @ 07:07)  A1C with Estimated Average Glucose Result: 5.6 % (03-29-23 @ 16:30)          03-31    137  |  103  |  7   ----------------------------<  94  3.9   |  24  |  1.06    eGFR: 64    Ca    9.9      03-31  Mg     2.40     03-31  Phos  3.2     03-31    TPro  7.6  /  Alb  4.6  /  TBili  1.0  /  DBili  x   /  AST  32  /  ALT  56<H>  /  AlkPhos  57  03-31      Thyroid Function Tests:  03-31 @ 07:07 TSH 75.71 FreeT4 -- T3 -- Anti TPO -- Anti Thyroglobulin Ab -- TSI --  03-30 @ 17:20 TSH 99.25 FreeT4 -- T3 101 Anti TPO -- Anti Thyroglobulin Ab -- TSI --                         Admitted for: Altered mental status    Following for: severe hypothyroidism    Subjective:   Patient feels well. Sister at bedside reports patient 80% back to baseline    AM cortisol 16    No other complaints    MEDICATIONS  (STANDING):  enoxaparin Injectable 40 milliGRAM(s) SubCutaneous every 24 hours  influenza   Vaccine 0.5 milliLiter(s) IntraMuscular once  levothyroxine Injectable 103 MICROGram(s) IV Push at bedtime  polyethylene glycol 3350 17 Gram(s) Oral daily  senna 2 Tablet(s) Oral at bedtime    MEDICATIONS  (PRN):  acetaminophen     Tablet .. 650 milliGRAM(s) Oral every 6 hours PRN Temp greater or equal to 38C (100.4F), Mild Pain (1 - 3)  aluminum hydroxide/magnesium hydroxide/simethicone Suspension 30 milliLiter(s) Oral every 4 hours PRN Dyspepsia  melatonin 3 milliGRAM(s) Oral at bedtime PRN Insomnia  ondansetron Injectable 4 milliGRAM(s) IV Push every 8 hours PRN Nausea and/or Vomiting      Allergies    No Known Allergies    Intolerances          PHYSICAL EXAM:  VITALS: T(C): 36.7 (03-31-23 @ 05:30)  T(F): 98.1 (03-31-23 @ 05:30), Max: 98.4 (03-30-23 @ 14:53)  HR: 81 (03-31-23 @ 05:30) (75 - 88)  BP: 126/74 (03-31-23 @ 05:30) (111/76 - 140/72)  RR:  (16 - 17)  SpO2:  (97% - 100%)  Wt(kg): --  GENERAL: NAD  EYES: No proptosis, no lid lag, anicteric  RESPIRATORY: Clear to auscultation bilaterally  CARDIOVASCULAR: Regular rate and rhythm  GI: Soft, nontender, non distended  EXT: b/l feet without wounds, 2+ pulses  PSYCH: Alert and oriented x 3, reactive affect      A1C with Estimated Average Glucose Result: 5.4 % (03-31-23 @ 07:07)  A1C with Estimated Average Glucose Result: 5.6 % (03-29-23 @ 16:30)          03-31    137  |  103  |  7   ----------------------------<  94  3.9   |  24  |  1.06    eGFR: 64    Ca    9.9      03-31  Mg     2.40     03-31  Phos  3.2     03-31    TPro  7.6  /  Alb  4.6  /  TBili  1.0  /  DBili  x   /  AST  32  /  ALT  56<H>  /  AlkPhos  57  03-31      Thyroid Function Tests:  03-31 @ 07:07 TSH 75.71 FreeT4 -- T3 -- Anti TPO -- Anti Thyroglobulin Ab -- TSI --  03-30 @ 17:20 TSH 99.25 FreeT4 -- T3 101 Anti TPO -- Anti Thyroglobulin Ab -- TSI --

## 2023-03-31 NOTE — PROGRESS NOTE ADULT - PROBLEM SELECTOR PLAN 5
- although pt's samples are mildly hemolyzed, both samples have mildly elevated LFTs  - unsure if it is side effects of sea collins  - acute hepatitis panel ordered  -trend LFTs - although pt's samples are mildly hemolyzed, both samples have mildly elevated LFTs  - unsure if it is side effects of sea collins  - acute hepatitis panel ordered  - trend LFTs; improved, only mild LFT elevation - although pt's samples are mildly hemolyzed, both samples have mildly elevated LFTs  - unsure if it is side effects of sea collins  - acute hepatitis panel ordered -> negative  - trend LFTs; improved, only mild LFT elevation

## 2023-03-31 NOTE — PROGRESS NOTE ADULT - ATTENDING COMMENTS
47F with newly diagnosed severe hypothyroidism, with change in mental status per family, visiting from [place]. Vitals stable thus not in myxedema.  Outpatient starting on levothyroxine 25 mcg po daily for a few days without improvement in symptoms.  TSH 82, Free T4 low at 0.5. Treated with 1 dose of IV levothyroxine at iV equivalent of weight based dose.  Checked 8AM cortisol within normal limits. Patient is clinically much improved. Ok to dc after one more IV dose this afternoon and start oral levothyroxine 137 mcg daily tomorrow morning. Reviewed to take on empty stomach. Follow up TFTs in 4 weeks in [place]. High risk patient high level decision making.    Leti Ventura MD  Division of Endocrinology  Pager: [phone]    If after 6PM or before 9AM, or on weekends/holidays, please call endocrine answering service for assistance ([phone]).  For nonurgent matters email [email or web address] for assistance.
47F with PMH of hypothyroidism on LT4 presenting w/ metabolic encephalopathy likely in setting of underdosed thyroid replacement therapy. Seen by endo. Exam not consistent w/ myxedema. Clinically improved w/ IV levothyroxine. At this point - no further need to remain in hospital. DC home w/ family. Further follow up will be needed. LT4 dose increased as per endo. 35 min spent preparing DC, counseling pt, and coordination of care.

## 2023-03-31 NOTE — DISCHARGE NOTE NURSING/CASE MANAGEMENT/SOCIAL WORK - NSDCFUADDAPPT_GEN_ALL_CORE_FT
Please follow-up with our medicine clinic (and ask to see Dr. Jourdan Denise) next week. Contact information to schedule appointment is provided. You will need to have thyroid tests repeat in four weeks. You can do this here or in [place]. If you are still in [place] in May, you should follow-up with our endocrinologists then. Office information will be provided.

## 2023-03-31 NOTE — DISCHARGE NOTE PROVIDER - NSDCMRMEDTOKEN_GEN_ALL_CORE_FT
levothyroxine 25 mcg (0.025 mg) oral tablet: 1 orally once a day   levothyroxine 137 mcg (0.137 mg) oral tablet: 1 tab(s) orally once a day Please take once a day in morning at least 30 minutes before eating any food or drinking caffeinated beverages. Start taking on April 1st

## 2023-03-31 NOTE — DISCHARGE NOTE PROVIDER - NSFOLLOWUPCLINICS_GEN_ALL_ED_FT
[facility] [place]  Internal Medicine  [address] [place] [address]  Phone: [phone]  Fax: [phone]  Follow Up Time: 1 week    [facility]  Endocrinology  [address] [place] [address]  Phone: [phone]  Fax:   Follow Up Time: 2 months

## 2023-03-31 NOTE — DISCHARGE NOTE PROVIDER - NSDCFUADDAPPT_GEN_ALL_CORE_FT
Please follow-up with our medicine clinic (and ask to see Dr. Jourdan Denise) next week. Contact information to schedule appointment is provided. You will need to have thyroid tests repeat in four weeks. You can do this here or in [place]. If you are still in [place] in May, you should follow-up with our endocrinologists then. Office information will be provided.

## 2023-03-31 NOTE — PROGRESS NOTE ADULT - PROBLEM SELECTOR PLAN 2
- pt A&OX3 on my exam but per family has moments of confusion/out of the ordinary  - most likely metabolic iso thyroid deficiency vs neurological cause  - pt had one episode of agitation during the day, receive 1 ativan, s/p head CT  - f/u CT head to make sure no bleed, although no focal neurological signs  - c/w thyroid supplementations  -Urine tox ordered  -Ammonia in AM Resolved  - pt A&OX3 on my exam but per family has moments of confusion/out of the ordinary  - most likely metabolic iso thyroid deficiency vs neurological cause  - pt had one episode of agitation during the day, receive 1 ativan, s/p head CT  - f/u CT head to make sure no bleed, although no focal neurological signs -> neg  - c/w thyroid supplementations  -Urine tox ordered Resolved  - pt A&OX3 on my exam but per family has moments of confusion/out of the ordinary  - most likely metabolic iso thyroid deficiency vs neurological cause  - pt had one episode of agitation during the day, receive 1 ativan, s/p head CT  - f/u CT head to make sure no bleed, although no focal neurological signs -> neg  - c/w thyroid supplementations  -Urine tox ordered - though low suspicion of toxins.

## 2023-04-12 PROBLEM — E03.9 HYPOTHYROIDISM, UNSPECIFIED: Chronic | Status: ACTIVE | Noted: 2023-03-31

## 2023-04-12 PROBLEM — Z00.00 ENCOUNTER FOR PREVENTIVE HEALTH EXAMINATION: Status: ACTIVE | Noted: 2023-04-12

## 2023-05-04 ENCOUNTER — APPOINTMENT (OUTPATIENT)
Dept: ENDOCRINOLOGY | Facility: CLINIC | Age: 48
End: 2023-05-04

## 2023-07-24 ENCOUNTER — APPOINTMENT (OUTPATIENT)
Dept: ENDOCRINOLOGY | Facility: CLINIC | Age: 48
End: 2023-07-24

## 2024-09-10 NOTE — CONSULT NOTE ADULT - ATTENDING COMMENTS
47F with newly diagnosed severe hypothyroidism, with change in mental status per family, visiting from [place]. Vitals stable thus not in myxedema.  Outpatient starting on levothyroxine 25 mcg po daily for a few days without improvement in symptoms.  TSH 82, Free T4 low at 0.5. Treat with IV levothyroxine for now at iV equivalent of weight based dose.  Check 8AM cortisol. High risk patient high level decision making.    Leti Ventura MD  Division of Endocrinology  Pager: [phone]    If after 6PM or before 9AM, or on weekends/holidays, please call endocrine answering service for assistance ([phone]).  For nonurgent matters email [email or web address] for assistance. Patient unable to complete